# Patient Record
Sex: FEMALE | Race: WHITE | NOT HISPANIC OR LATINO | Employment: OTHER | URBAN - METROPOLITAN AREA
[De-identification: names, ages, dates, MRNs, and addresses within clinical notes are randomized per-mention and may not be internally consistent; named-entity substitution may affect disease eponyms.]

---

## 2017-01-30 ENCOUNTER — TELEPHONE (OUTPATIENT)
Dept: NURSING | Facility: CLINIC | Age: 47
End: 2017-01-30

## 2017-01-31 NOTE — TELEPHONE ENCOUNTER
Call Type: Triage Call    Presenting Problem: Last couple days used her inhaler for a heaviness  in her chest which seemed to help. Today upper abd pain and upper  back pain mostly in one shoulder area. mom had triple bypass at age  56. Refused to call 911. Will go to ED. Advised not to drive  herself.  Triage Note:  Guideline Title: Chest Pain  Recommended Disposition: Activate   Original Inclination: Would have called ED  Override Disposition:  Intended Action: Go to Hospital / ED  Physician Contacted: No  Chest pain spreading to the shoulders, neck, jaw, in one or both arms, stomach or  back lasting 5 or more minutes now or within the last hour. Pain is NOT  associated with taking a deep breath or a productive cough, movement, or touch to  a localized area. ?  YES  Loss of consciousness for any period of time ? NO  New or worsening signs and symptoms that may indicate shock ? NO  Pressure, fullness, squeezing sensation or pain anywhere in the chest lasting 5 or  more minutes now or within the last hour. Pain is NOT associated with taking a  deep breath or a productive cough, movement, or touch to a localized area on the  chest. ? NO  Physician Instructions:  Care Advice:

## 2017-05-16 ENCOUNTER — RECORDS - HEALTHEAST (OUTPATIENT)
Dept: LAB | Facility: CLINIC | Age: 47
End: 2017-05-16

## 2017-05-16 LAB
CHOLEST SERPL-MCNC: 182 MG/DL
FASTING STATUS PATIENT QL REPORTED: NO
HDLC SERPL-MCNC: 63 MG/DL
LDLC SERPL CALC-MCNC: 94 MG/DL
TRIGL SERPL-MCNC: 124 MG/DL

## 2018-04-17 ENCOUNTER — HOSPITAL ENCOUNTER (OUTPATIENT)
Dept: MAMMOGRAPHY | Facility: CLINIC | Age: 48
Discharge: HOME | End: 2018-04-17
Attending: FAMILY MEDICINE

## 2018-04-17 DIAGNOSIS — Z12.31 VISIT FOR SCREENING MAMMOGRAM: ICD-10-CM

## 2018-12-06 ENCOUNTER — OFFICE VISIT (OUTPATIENT)
Dept: FAMILY MEDICINE | Facility: CLINIC | Age: 48
End: 2018-12-06
Payer: COMMERCIAL

## 2018-12-06 VITALS
HEART RATE: 90 BPM | OXYGEN SATURATION: 95 % | TEMPERATURE: 99 F | BODY MASS INDEX: 27.11 KG/M2 | SYSTOLIC BLOOD PRESSURE: 130 MMHG | RESPIRATION RATE: 16 BRPM | DIASTOLIC BLOOD PRESSURE: 86 MMHG | WEIGHT: 183 LBS | HEIGHT: 69 IN

## 2018-12-06 DIAGNOSIS — J98.01 ACUTE BRONCHOSPASM: ICD-10-CM

## 2018-12-06 DIAGNOSIS — J06.9 UPPER RESPIRATORY TRACT INFECTION, UNSPECIFIED TYPE: ICD-10-CM

## 2018-12-06 DIAGNOSIS — R59.1 LA (LYMPHADENOPATHY): ICD-10-CM

## 2018-12-06 DIAGNOSIS — R07.0 THROAT PAIN: Primary | ICD-10-CM

## 2018-12-06 DIAGNOSIS — K50.00 CROHN'S DISEASE OF SMALL INTESTINE WITHOUT COMPLICATION (H): ICD-10-CM

## 2018-12-06 DIAGNOSIS — R68.89 ABNORMAL NECK FINDING: ICD-10-CM

## 2018-12-06 LAB
DEPRECATED S PYO AG THROAT QL EIA: NORMAL
SPECIMEN SOURCE: NORMAL

## 2018-12-06 PROCEDURE — 87081 CULTURE SCREEN ONLY: CPT | Performed by: NURSE PRACTITIONER

## 2018-12-06 PROCEDURE — 87880 STREP A ASSAY W/OPTIC: CPT | Performed by: NURSE PRACTITIONER

## 2018-12-06 PROCEDURE — 99204 OFFICE O/P NEW MOD 45 MIN: CPT | Performed by: NURSE PRACTITIONER

## 2018-12-06 RX ORDER — BALSALAZIDE DISODIUM 750 MG/1
2250 CAPSULE ORAL 3 TIMES DAILY
COMMUNITY

## 2018-12-06 RX ORDER — ALBUTEROL SULFATE 90 UG/1
2 AEROSOL, METERED RESPIRATORY (INHALATION) EVERY 6 HOURS PRN
Qty: 1 INHALER | Refills: 0 | Status: SHIPPED | OUTPATIENT
Start: 2018-12-06 | End: 2019-06-12

## 2018-12-06 RX ORDER — BENZONATATE 200 MG/1
200 CAPSULE ORAL 3 TIMES DAILY PRN
Qty: 21 CAPSULE | Refills: 0 | Status: SHIPPED | OUTPATIENT
Start: 2018-12-06 | End: 2019-05-29

## 2018-12-06 RX ORDER — CODEINE PHOSPHATE AND GUAIFENESIN 10; 100 MG/5ML; MG/5ML
1 SOLUTION ORAL
Qty: 120 ML | Refills: 0 | Status: SHIPPED | OUTPATIENT
Start: 2018-12-06 | End: 2019-05-29

## 2018-12-06 RX ORDER — IPRATROPIUM BROMIDE 42 UG/1
2 SPRAY, METERED NASAL 4 TIMES DAILY PRN
Qty: 1 BOX | Refills: 1 | Status: SHIPPED | OUTPATIENT
Start: 2018-12-06 | End: 2019-05-29

## 2018-12-06 NOTE — NURSING NOTE
"Initial /86  Pulse 90  Temp 99  F (37.2  C) (Tympanic)  Resp 16  Ht 5' 9\" (1.753 m)  Wt 183 lb (83 kg)  LMP 11/25/2018 (Approximate)  SpO2 95%  Breastfeeding? No  BMI 27.02 kg/m2 Estimated body mass index is 27.02 kg/(m^2) as calculated from the following:    Height as of this encounter: 5' 9\" (1.753 m).    Weight as of this encounter: 183 lb (83 kg). .    Judy Finnegan CMA ([facility])  "

## 2018-12-06 NOTE — MR AVS SNAPSHOT
After Visit Summary   12/6/2018    Nini Burden    MRN: [id number]           Patient Information     Date Of Birth          1970        Visit Information        Provider Department      12/6/2018 11:00 AM Christine Mcclelland NP [facility]        Today's Diagnoses     Throat pain    -  1    Upper respiratory tract infection, unspecified type        Acute bronchospasm        LA (lymphadenopathy)        Abnormal neck finding        Crohn's disease of small intestine without complication (H)          Care Instructions    1. Drink plenty of fluids.  2. May use tylenol or ibuprofen for any discomfort you are having.  3. Use Neti pot or nasal saline if you are having nasal or sinus congestion  4. Use over the counter decongestant if needed  5. Use over the counter cough suppressant at night if needed  6. Suggest humidifier in room at night  7. Return to clinic if no improvement in 1-2 weeks  8. Prescription written for Cough medication both for day and nighttime, as well as ALbuterol inhaler and Nasal spray.    Recommended follow up with ENT as suggested previously.    Follow-up with me or PCP in clinic to recheck lymph nodes and neck after illness has resolved.    LILIAM Seo              Follow-ups after your visit        Who to contact     If you have questions or need follow up information about today's clinic visit or your schedule please contact [facility] directly at [phone].  Normal or non-critical lab and imaging results will be communicated to you by MyChart, letter or phone within 4 business days after the clinic has received the results. If you do not hear from us within 7 days, please contact the clinic through MyChart or phone. If you have a critical or abnormal lab result, we will notify you by phone as soon as possible.  Submit refill requests through [employer] or call your pharmacy and they will forward the refill request to us. Please allow 3  "business days for your refill to be completed.          Additional Information About Your Visit        Care EveryWhere ID     This is your Care EveryWhere ID. This could be used by other organizations to access your [facility] medical records  [id number]        Your Vitals Were     Pulse Temperature Respirations Height Last Period Pulse Oximetry    90 99  F (37.2  C) (Tympanic) 16 5' 9\" (1.753 m) 11/25/2018 (Approximate) 95%    Breastfeeding? BMI (Body Mass Index)                No 27.02 kg/m2           Blood Pressure from Last 3 Encounters:   12/06/18 130/86   05/25/16 90/62   10/20/12 136/74    Weight from Last 3 Encounters:   12/06/18 183 lb (83 kg)   05/25/16 169 lb (76.7 kg)   11/16/12 165 lb (74.8 kg)              We Performed the Following     Beta strep group A culture     Rapid strep screen          Today's Medication Changes          These changes are accurate as of 12/6/18 11:46 AM.  If you have any questions, ask your nurse or doctor.               Start taking these medicines.        Dose/Directions    albuterol 108 (90 Base) MCG/ACT inhaler   Commonly known as:  PROAIR HFA/PROVENTIL HFA/VENTOLIN HFA   Used for:  Acute bronchospasm, Upper respiratory tract infection, unspecified type   Started by:  Christine Mcclelland NP        Dose:  2 puff   Inhale 2 puffs into the lungs every 6 hours as needed for shortness of breath / dyspnea or wheezing   Quantity:  1 Inhaler   Refills:  0       benzonatate 200 MG capsule   Commonly known as:  TESSALON   Used for:  Acute bronchospasm, Upper respiratory tract infection, unspecified type   Started by:  Christine Mcclelland NP        Dose:  200 mg   Take 1 capsule (200 mg) by mouth 3 times daily as needed for cough   Quantity:  21 capsule   Refills:  0       guaiFENesin-codeine 100-10 MG/5ML solution   Commonly known as:  ROBITUSSIN AC   Used for:  Upper respiratory tract infection, unspecified type, Acute bronchospasm, Throat pain   Started by:  Christine Mcclelland" MAURI Thompson        Dose:  1 tsp.   Take 5 mLs by mouth nightly as needed for cough   Quantity:  120 mL   Refills:  0       ipratropium 0.06 % nasal spray   Commonly known as:  ATROVENT   Used for:  Acute bronchospasm, Upper respiratory tract infection, unspecified type   Started by:  Christine Mcclelland NP        Dose:  2 spray   Spray 2 sprays into both nostrils 4 times daily as needed for rhinitis   Quantity:  1 Box   Refills:  1            Where to get your medicines      These medications were sent to [facility] [address]  [address] [address]     Phone:  [phone]     albuterol 108 (90 Base) MCG/ACT inhaler    benzonatate 200 MG capsule    ipratropium 0.06 % nasal spray         Some of these will need a paper prescription and others can be bought over the counter.  Ask your nurse if you have questions.     Bring a paper prescription for each of these medications     guaiFENesin-codeine 100-10 MG/5ML solution                Primary Care Provider Office Phone # Fax #    [facility] [phone]       [address] [address]        Equal Access to Services     ESVIN CARRERO AH: Hadii peter gonzales hadciroo Sosommer, waaxda luqadaha, qaybta kaalmada adeegyada, alena brooks. So [facility] [phone].    ATENCIÓN: Si habla español, tiene a delgadillo disposición servicios gratuitos de asistencia lingüística. Coretta al [phone].    We comply with applicable federal civil rights laws and [place] laws. We do not discriminate on the basis of race, color, national origin, age, disability, sex, sexual orientation, or gender identity.            Thank you!     Thank you for choosing [facility]  for your care. Our goal is always to provide you with excellent care. Hearing back from our patients is one way we can continue to improve our services. Please take a few minutes to complete the written  survey that you may receive in the mail after your visit with us. Thank you!             Your Updated Medication List - Protect others around you: Learn how to safely use, store and throw away your medicines at www.disposemymeds.org.          This list is accurate as of 12/6/18 11:46 AM.  Always use your most recent med list.                   Brand Name Dispense Instructions for use Diagnosis    albuterol 108 (90 Base) MCG/ACT inhaler    PROAIR HFA/PROVENTIL HFA/VENTOLIN HFA    1 Inhaler    Inhale 2 puffs into the lungs every 6 hours as needed for shortness of breath / dyspnea or wheezing    Acute bronchospasm, Upper respiratory tract infection, unspecified type       albuterol 90 MCG/ACT inhaler      1 TO 2 PUFFS BY INHALATION EVERY 4 TO 6 HOURS        balsalazide 750 MG capsule    COLAZAL     Take 2,250 mg by mouth 3 times daily        benzonatate 200 MG capsule    TESSALON    21 capsule    Take 1 capsule (200 mg) by mouth 3 times daily as needed for cough    Acute bronchospasm, Upper respiratory tract infection, unspecified type       guaiFENesin-codeine 100-10 MG/5ML solution    ROBITUSSIN AC    120 mL    Take 5 mLs by mouth nightly as needed for cough    Upper respiratory tract infection, unspecified type, Acute bronchospasm, Throat pain       ipratropium 0.06 % nasal spray    ATROVENT    1 Box    Spray 2 sprays into both nostrils 4 times daily as needed for rhinitis    Acute bronchospasm, Upper respiratory tract infection, unspecified type       ORTHO-NOVUM 7/7/7 (28) PO      1 TABLET BY MOUTH DAILY        oxyCODONE-acetaminophen 5-325 MG tablet    PERCOCET     Take 1 tablet by mouth 3 times daily

## 2018-12-06 NOTE — PATIENT INSTRUCTIONS
1. Drink plenty of fluids.  2. May use tylenol or ibuprofen for any discomfort you are having.  3. Use Neti pot or nasal saline if you are having nasal or sinus congestion  4. Use over the counter decongestant if needed  5. Use over the counter cough suppressant at night if needed  6. Suggest humidifier in room at night  7. Return to clinic if no improvement in 1-2 weeks  8. Prescription written for Cough medication both for day and nighttime, as well as ALbuterol inhaler and Nasal spray.    Recommended follow up with ENT as suggested previously.    Follow-up with me or PCP in clinic to recheck lymph nodes and neck after illness has resolved.    LILIAM Seo

## 2018-12-06 NOTE — PROGRESS NOTES
SUBJECTIVE:   Nini Burden is a 48 year old female who presents to clinic today for the following health issues:    ENT Symptoms             Symptoms: cc Present Absent Comment   Fever/Chills   x    Fatigue  x     Muscle Aches  x     Eye Irritation   x    Sneezing   x    Nasal Jordin/Drg  x     Sinus Pressure/Pain  x     Loss of smell   x    Dental pain   x    Sore Throat  x     Swollen Glands   x    Ear Pain/Fullness  x  Itchy ears    Cough  x  Intermittently productive    Wheeze  x     Chest Pain   x Chest feels heavy and tight.    Shortness of breath  x     Rash   x    Other   x      Symptom duration:  4 days    Symptom severity:  moderate    Treatments tried:  cough drops, chloraseptic spray, oxycodone for pains    Contacts: None      Started as sore throat and cough, now more upper respiratory and congestion with runny nose.    No fevers - some chills.  No change in appetite.    Feels more chest congestion - no shortness of breath   No chest pain   History of Crohn's - treated with Balsalazide - managed well.      Problem list and histories reviewed & adjusted, as indicated.  Additional history: as documented    Patient Active Problem List   Diagnosis     Crohns disease (H)     Fibromyalgia     ALMA (Obstructive Sleep Apnea)-Mild (AHI 12); REM RDI 68     Past Surgical History:   Procedure Laterality Date     SALPINGO OOPHORECTOMY,R/L/ARY  1984     Oophorectomy, LT     SURGICAL HISTORY OF -   2001    excized ganglion cyst       Social History   Substance Use Topics     Smoking status: Former Smoker     Packs/day: 0.50     Years: 20.00     Types: Cigarettes     Smokeless tobacco: Never Used     Alcohol use Yes      Comment: 6 beers a month     Family History   Problem Relation Age of Onset     Diabetes Mother      Respiratory Father      ALMA     Respiratory Brother      ALMA         Current Outpatient Prescriptions   Medication Sig Dispense Refill     ALBUTEROL 90 MCG/ACT IN AERS 1 TO 2 PUFFS BY INHALATION EVERY 4  "TO 6 HOURS       balsalazide (COLAZAL) 750 MG capsule Take 2,250 mg by mouth 3 times daily       ORTHO-NOVUM 7/7/7 (28) OR 1 TABLET BY MOUTH DAILY       oxyCODONE-acetaminophen (PERCOCET) 5-325 MG per tablet Take 1 tablet by mouth 3 times daily       Allergies   Allergen Reactions     Amoxicillin [Penicillins] Rash     Full body rash     Citalopram      Rapid heart beat, sensation of burning up legs     Erythromycin Nausea and Vomiting     Vomiting     Sulfa Drugs Difficulty breathing     Lung spasms     Recent Labs   Lab Test 05/03/16 03/10/16  10/20/12   1955  04/13/12 04/08/11   LDL  133   --    --    --   116  131   HDL  65   --    --    --   54  69   TRIG  98   --    --    --   142  88   ALT  19  23  30   < >   --   31   CR  0.72  0.68  0.70   < >   --   0.71   GFRESTIMATED  >60  >60  >90   < >   --   >60   GFRESTBLACK  >60  >60  >90   < >   --   >60   POTASSIUM  4.0  4.0  3.4   < >   --   4.0   TSH  1.96  1.17   --    --    --    --     < > = values in this interval not displayed.      BP Readings from Last 3 Encounters:   12/06/18 130/86   05/25/16 90/62   10/20/12 136/74    Wt Readings from Last 3 Encounters:   12/06/18 183 lb (83 kg)   05/25/16 169 lb (76.7 kg)   11/16/12 165 lb (74.8 kg)                  Labs reviewed in EPIC    Reviewed and updated as needed this visit by clinical staff       Reviewed and updated as needed this visit by Provider         ROS:  Constitutional, HEENT, cardiovascular, pulmonary, GI, , musculoskeletal, neuro, skin, endocrine and psych systems are negative, except as otherwise noted.    OBJECTIVE:     /86  Pulse 90  Temp 99  F (37.2  C) (Tympanic)  Resp 16  Ht 5' 9\" (1.753 m)  Wt 183 lb (83 kg)  LMP 11/25/2018 (Approximate)  SpO2 95%  Breastfeeding? No  BMI 27.02 kg/m2  Body mass index is 27.02 kg/(m^2).  GENERAL: alert and no distress  EYES: PERRL, EOMI and clear drainage bilateral, normal conjunctiva  HENT: normal cephalic/atraumatic, both ears: clear " effusion, nasal mucosa edematous , rhinorrhea clear, oral mucous membranes moist and tonsillar erythema  NECK: lymphadenopathy right anterior cervical chain, swelling right neck adjacent to thyroid gland.  Thyroid gland 1 1/2 X normal size.  RESP: lungs clear to auscultation - no rales, rhonchi or wheezes  BREAST: normal without masses, tenderness or nipple discharge and no palpable axillary masses or adenopathy  CV: regular rate and rhythm, normal S1 S2, no S3 or S4, no murmur, click or rub, no peripheral edema and peripheral pulses strong  ABDOMEN: soft, nontender, no hepatosplenomegaly, no masses and bowel sounds normal  MS: no gross musculoskeletal defects noted, no edema  SKIN: no suspicious lesions or rashes  NEURO: Normal strength and tone, mentation intact and speech normal  PSYCH: mentation appears normal, affect normal/bright    Diagnostic Test Results:  Results for orders placed or performed in visit on 12/06/18 (from the past 24 hour(s))   Rapid strep screen   Result Value Ref Range    Specimen Description Throat     Rapid Strep A Screen       NEGATIVE: No Group A streptococcal antigen detected by immunoassay, await culture report.       ASSESSMENT/PLAN:     1. Upper respiratory tract infection, unspecified type   The risks, benefits and treatment options of prescribed medications or other treatments have been discussed with the patient. The patient verbalized their understanding and should call or follow up if no improvement or if they develop further problems.    - albuterol (PROAIR HFA/PROVENTIL HFA/VENTOLIN HFA) 108 (90 Base) MCG/ACT inhaler; Inhale 2 puffs into the lungs every 6 hours as needed for shortness of breath / dyspnea or wheezing  Dispense: 1 Inhaler; Refill: 0  - ipratropium (ATROVENT) 0.06 % nasal spray; Spray 2 sprays into both nostrils 4 times daily as needed for rhinitis  Dispense: 1 Box; Refill: 1  - guaiFENesin-codeine (ROBITUSSIN AC) 100-10 MG/5ML solution; Take 5 mLs by mouth  nightly as needed for cough  Dispense: 120 mL; Refill: 0  - benzonatate (TESSALON) 200 MG capsule; Take 1 capsule (200 mg) by mouth 3 times daily as needed for cough  Dispense: 21 capsule; Refill: 0    2. Acute bronchospasm     - albuterol (PROAIR HFA/PROVENTIL HFA/VENTOLIN HFA) 108 (90 Base) MCG/ACT inhaler; Inhale 2 puffs into the lungs every 6 hours as needed for shortness of breath / dyspnea or wheezing  Dispense: 1 Inhaler; Refill: 0  - ipratropium (ATROVENT) 0.06 % nasal spray; Spray 2 sprays into both nostrils 4 times daily as needed for rhinitis  Dispense: 1 Box; Refill: 1  - guaiFENesin-codeine (ROBITUSSIN AC) 100-10 MG/5ML solution; Take 5 mLs by mouth nightly as needed for cough  Dispense: 120 mL; Refill: 0  - benzonatate (TESSALON) 200 MG capsule; Take 1 capsule (200 mg) by mouth 3 times daily as needed for cough  Dispense: 21 capsule; Refill: 0    3. Throat pain     - Rapid strep screen  - Beta strep group A culture  - guaiFENesin-codeine (ROBITUSSIN AC) 100-10 MG/5ML solution; Take 5 mLs by mouth nightly as needed for cough  Dispense: 120 mL; Refill: 0    4. LA (lymphadenopathy)  Most likely related to above findings - but recommended recheck given abnormal neck findings and thyroid findings.  Follow-up with ENT as discussed.    5. Abnormal neck finding   Follow-up with PCP and ENT to recheck in 2-3 weeks.  Most likely would need US neck/thyroid.    6. Crohn's disease of small intestine without complication (H)   Stable on oral medication.    Total times spent with patient 30 minutes of which > 50% of the time was spent counseling and coordination of care discussion of above complaints, review of chart, medications, problem list, recommendations discussed, referral, treatment plan and follow up     Patient Instructions   1. Drink plenty of fluids.  2. May use tylenol or ibuprofen for any discomfort you are having.  3. Use Neti pot or nasal saline if you are having nasal or sinus congestion  4. Use over  the counter decongestant if needed  5. Use over the counter cough suppressant at night if needed  6. Suggest humidifier in room at night  7. Return to clinic if no improvement in 1-2 weeks  8. Prescription written for Cough medication both for day and nighttime, as well as ALbuterol inhaler and Nasal spray.    Recommended follow up with ENT as suggested previously.    Follow-up with me or PCP in clinic to recheck lymph nodes and neck after illness has resolved.    LILIAM Seo NP  [facility]

## 2018-12-06 NOTE — LETTER
December 7, 2018      Nini Burden  [address]  [place] [address]        The results of your recent throat culture were negative.  If you have any further questions or concerns please contact the clinic.            Sincerely,        Christine Mcclelland NP

## 2018-12-06 NOTE — LETTER
[facility]  [address] [address]  Phone: [phone]    December 6, 2018        Nini Burden  [address] [place] [address]          To whom it may concern:    RE: Nini Burden    Patient was seen and treated today at our clinic and missed work 12/5/2018 and 12/6/2018.      Please contact me for questions or concerns.      Sincerely,        Christine Mcclelland NP

## 2018-12-07 LAB
BACTERIA SPEC CULT: NORMAL
SPECIMEN SOURCE: NORMAL

## 2019-03-22 ENCOUNTER — RECORDS - HEALTHEAST (OUTPATIENT)
Dept: LAB | Facility: CLINIC | Age: 49
End: 2019-03-22

## 2019-03-22 ENCOUNTER — TRANSFERRED RECORDS (OUTPATIENT)
Dept: HEALTH INFORMATION MANAGEMENT | Facility: CLINIC | Age: 49
End: 2019-03-22

## 2019-03-22 LAB
ALBUMIN SERPL-MCNC: 4 G/DL (ref 3.5–5)
ALP SERPL-CCNC: 82 U/L (ref 45–120)
ALT SERPL W P-5'-P-CCNC: 19 U/L (ref 0–45)
ANION GAP SERPL CALCULATED.3IONS-SCNC: 9 MMOL/L (ref 5–18)
AST SERPL W P-5'-P-CCNC: 22 U/L (ref 0–40)
BILIRUB SERPL-MCNC: 0.4 MG/DL (ref 0–1)
BUN SERPL-MCNC: 13 MG/DL (ref 8–22)
CALCIUM SERPL-MCNC: 9.8 MG/DL (ref 8.5–10.5)
CHLORIDE BLD-SCNC: 103 MMOL/L (ref 98–107)
CO2 SERPL-SCNC: 26 MMOL/L (ref 22–31)
CREAT SERPL-MCNC: 0.84 MG/DL (ref 0.6–1.1)
GFR SERPL CREATININE-BSD FRML MDRD: >60 ML/MIN/1.73M2
GLUCOSE BLD-MCNC: 85 MG/DL (ref 70–125)
PAP SMEAR - HIM PATIENT REPORTED: NEGATIVE
POTASSIUM BLD-SCNC: 4.4 MMOL/L (ref 3.5–5)
PROT SERPL-MCNC: 7.1 G/DL (ref 6–8)
SODIUM SERPL-SCNC: 138 MMOL/L (ref 136–145)

## 2019-05-29 ENCOUNTER — OFFICE VISIT (OUTPATIENT)
Dept: FAMILY MEDICINE | Facility: CLINIC | Age: 49
End: 2019-05-29
Payer: COMMERCIAL

## 2019-05-29 ENCOUNTER — TELEPHONE (OUTPATIENT)
Dept: FAMILY MEDICINE | Facility: CLINIC | Age: 49
End: 2019-05-29

## 2019-05-29 VITALS
HEIGHT: 68 IN | DIASTOLIC BLOOD PRESSURE: 82 MMHG | HEART RATE: 91 BPM | WEIGHT: 186.7 LBS | RESPIRATION RATE: 18 BRPM | BODY MASS INDEX: 28.3 KG/M2 | TEMPERATURE: 99 F | OXYGEN SATURATION: 97 % | SYSTOLIC BLOOD PRESSURE: 118 MMHG

## 2019-05-29 DIAGNOSIS — R10.13 EPIGASTRIC PAIN: Primary | ICD-10-CM

## 2019-05-29 DIAGNOSIS — A09 DIARRHEA OF INFECTIOUS ORIGIN: ICD-10-CM

## 2019-05-29 PROCEDURE — 99213 OFFICE O/P EST LOW 20 MIN: CPT | Performed by: NURSE PRACTITIONER

## 2019-05-29 RX ORDER — ONDANSETRON 4 MG/1
4 TABLET, ORALLY DISINTEGRATING ORAL EVERY 8 HOURS PRN
Qty: 15 TABLET | Refills: 2 | Status: SHIPPED | OUTPATIENT
Start: 2019-05-29 | End: 2019-06-12

## 2019-05-29 ASSESSMENT — MIFFLIN-ST. JEOR: SCORE: 1511.4

## 2019-05-29 NOTE — PROGRESS NOTES
"Subjective     Nini Burden is a 49 year old female who presents to clinic today for the following health issues:    Chief Complaint   Patient presents with     Diarrhea     Came back last Monday from a road trip, went to [facility], ever since then she has been nausous and has been having diarrhea off and on      Nausea     Feeling nauseous and fatigued          HPI   Diarrhea  Onset: 9 days intermittent     Description:   Consistency of stool: watery  Blood in stool: no   Number of loose stools in past 24 hours: 0     Progression of Symptoms:  intermittent    Accompanying Signs & Symptoms:  Fever: no   Nausea or vomiting; YES- nauesous   Abdominal pain: epigastric pain   Episodes of constipation: no   Weight loss: YES- 5 pounds     History:   Ill contacts: no   Recent use of antibiotics: no    Recent travels: YES- went to [facility]          Recent medication-new or changes(Rx or OTC): no     Precipitating factors:   None     Alleviating factors:   None     Therapies Tried and outcome: Has crohns and upped her crohns medication- this seemed to help       BP Readings from Last 3 Encounters:   05/29/19 118/82   12/06/18 130/86   05/25/16 90/62    Wt Readings from Last 3 Encounters:   05/29/19 84.7 kg (186 lb 11.2 oz)   12/06/18 83 kg (183 lb)   05/25/16 76.7 kg (169 lb)            Reviewed and updated as needed this visit by Provider         Review of Systems   ROS COMP: Constitutional, HEENT, cardiovascular, pulmonary, gi and gu systems are negative, except as otherwise noted.      Objective    /82 (BP Location: Left arm, Patient Position: Sitting, Cuff Size: Adult Regular)   Pulse 91   Temp 99  F (37.2  C) (Tympanic)   Resp 18   Ht 1.721 m (5' 7.75\")   Wt 84.7 kg (186 lb 11.2 oz)   LMP 05/14/2019   SpO2 97%   BMI 28.60 kg/m    Body mass index is 28.6 kg/m .  Physical Exam   GENERAL: healthy, alert and no distress  ABDOMEN: soft, nontender, without hepatosplenomegaly or masses, tenderness epigastric and " no organomegaly or masses  PSYCH: mentation appears normal, affect normal/bright    Diagnostic Test Results:  Labs reviewed in Epic        Assessment & Plan     1. Epigastric pain  -start Zantac 150 mg twice daily   -if still no improvement will add PPI: Omeprazole 20 mg daily AM   - ondansetron (ZOFRAN-ODT) 4 MG ODT tab; Take 1 tablet (4 mg) by mouth every 8 hours as needed for nausea or vomiting  Dispense: 15 tablet; Refill: 2    2. Diarrhea of infectious origin  -symptoms improving, likely due to viral gastroenteritis   -discussed BRAT diet   - Enteric Bacteria and Virus Panel by [facility] Stool; Future     See Patient Instructions    Return in about 3 days (around 6/1/2019) for Lab Work.    JOSEPH De Los Santos [facility]

## 2019-05-29 NOTE — TELEPHONE ENCOUNTER
Patient had pap done March 22 2019 at [facility] in [facility]. Pap was normal, needs to repeat pap every 3 years, please update her chart    Thanks,    Philomena Huggins MA

## 2019-05-29 NOTE — PATIENT INSTRUCTIONS
Zantac 150 mg twice daily before meals, if no improvement can try Omeprazole, or Protonix, please call clinic for prescription   Zofran 1 tablet every 8 hrs as needed for nausea and vomiting  Labs: to check stool for infections if still having diarrhea

## 2019-06-12 ENCOUNTER — OFFICE VISIT (OUTPATIENT)
Dept: FAMILY MEDICINE | Facility: CLINIC | Age: 49
End: 2019-06-12
Payer: COMMERCIAL

## 2019-06-12 VITALS
OXYGEN SATURATION: 96 % | WEIGHT: 186.6 LBS | HEART RATE: 81 BPM | BODY MASS INDEX: 29.29 KG/M2 | DIASTOLIC BLOOD PRESSURE: 82 MMHG | TEMPERATURE: 98.6 F | SYSTOLIC BLOOD PRESSURE: 118 MMHG | HEIGHT: 67 IN | RESPIRATION RATE: 16 BRPM

## 2019-06-12 DIAGNOSIS — J02.9 THROAT SORENESS: Primary | ICD-10-CM

## 2019-06-12 LAB
DEPRECATED S PYO AG THROAT QL EIA: NORMAL
SPECIMEN SOURCE: NORMAL

## 2019-06-12 PROCEDURE — 99213 OFFICE O/P EST LOW 20 MIN: CPT | Performed by: FAMILY MEDICINE

## 2019-06-12 PROCEDURE — 87081 CULTURE SCREEN ONLY: CPT | Performed by: FAMILY MEDICINE

## 2019-06-12 PROCEDURE — 87880 STREP A ASSAY W/OPTIC: CPT | Performed by: FAMILY MEDICINE

## 2019-06-12 RX ORDER — MULTIPLE VITAMINS W/ MINERALS TAB 9MG-400MCG
1 TAB ORAL DAILY
COMMUNITY

## 2019-06-12 ASSESSMENT — MIFFLIN-ST. JEOR: SCORE: 1503

## 2019-06-12 NOTE — LETTER
June 13, 2019      Nini Burden  [address]  [place] [address]        The results of your recent throat culture were negative.  If you have any further questions or concerns please contact the clinic.            Sincerely,        Lavelle Doe MD/jefferson

## 2019-06-12 NOTE — PATIENT INSTRUCTIONS
Thank you for choosing [facility].  You may be receiving an email and/or telephone survey request from [facility] [employer] regarding your visit today.  Please take a few minutes to respond to the survey to let us know how we are doing.      If you have questions or concerns, please contact us via [employer] or you can contact your care team at [phone].    Our Clinic hours are:  Monday 6:40 am  to 7:00 pm  Tuesday -Friday 6:40 am to 5:00 pm    The [place] outpatient lab hours are:  Monday - Friday 6:10 am to 4:45 pm  Saturdays 7:00 am to 11:00 am  Appointments are required, call [phone]    If you have clinical questions after hours or would like to schedule an appointment,  call the clinic at [phone].    (J02.9) Throat soreness  (primary encounter diagnosis)  Comment:   Plan: Rapid strep screen, Beta strep group A culture        The rapid strep test is negative and the 24 hour is pending. If this is positive then Zithromax is recommended.   Avoid contagious exposures and use the symptomatic therapies. Follow up as needed.

## 2019-06-12 NOTE — PROGRESS NOTES
"Subjective     Nini Burden is a 49 year old female who presents to clinic today for the following health issues:  Chief Complaint   Patient presents with     Pharyngitis         ENT Symptoms             Symptoms: cc Present Absent Comment   Fever/Chills  X  Chills   Fatigue  X     Muscle Aches  X     Eye Irritation  X  Dry    Sneezing   X    Nasal Jordin/Drg  X     Sinus Pressure/Pain   X    Loss of smell   X    Dental pain   X    Sore Throat X X     Swollen Glands  X     Ear Pain/Fullness   X Itchy Ears   Cough   X    Wheeze   X    Chest Pain   X    Shortness of breath   X    Rash   X    Other  X  Swollen Uvula      Symptom duration:  x 2 Days    Symptom severity:  moderate   Treatments tried:  Chloroseptic spray    Contacts:  none          Current Outpatient Medications:      balsalazide (COLAZAL) 750 MG capsule, Take 2,250 mg by mouth 3 times daily, Disp: , Rfl:      Cholecalciferol (VITAMIN D PO), 3 tablets daily , 3,000 units daily, Disp: , Rfl:      multivitamin w/minerals (MULTI-VITAMIN) tablet, Take 1 tablet by mouth daily, Disp: , Rfl:      ORTHO-NOVUM 7/7/7 (28) OR, 1 TABLET BY MOUTH DAILY, Disp: , Rfl:      oxyCODONE-acetaminophen (PERCOCET) 5-325 MG per tablet, Take 1 tablet by mouth 3 times daily, Disp: , Rfl:      Taurine 1000 MG CAPS, Take 1 capsule by mouth daily, Disp: , Rfl:      vitamin B complex with vitamin C (VITAMIN  B COMPLEX) tablet, Take 1 tablet by mouth daily, Disp: , Rfl:     Patient Active Problem List   Diagnosis     Crohns disease (H)     Fibromyalgia     ALMA (Obstructive Sleep Apnea)-Mild (AHI 12); REM RDI 68       Blood pressure 118/82, pulse 81, temperature 98.6  F (37  C), temperature source Tympanic, resp. rate 16, height 1.708 m (5' 7.25\"), weight 84.6 kg (186 lb 9.6 oz), last menstrual period 06/09/2019, SpO2 96 %, not currently breastfeeding.    Exam:  GENERAL APPEARANCE: healthy, alert and no distress  EYES: EOMI,  PERRL  HENT: ear canals and TM's normal and nose and mouth " without ulcers or lesions  HENT: tonsillar hypertrophy  NECK: bilateral anterior cervical adenopathy  NEURO: Normal strength and tone, sensory exam grossly normal, mentation intact and speech normal      (J02.9) Throat soreness  (primary encounter diagnosis)  Comment:   Plan: Rapid strep screen, Beta strep group A culture        The rapid strep test is negative and the 24 hour is pending. If this is positive then Zithromax is recommended.   Avoid contagious exposures and use the symptomatic therapies. Follow up as needed.       Lavelle Doe

## 2019-06-13 LAB
BACTERIA SPEC CULT: NORMAL
SPECIMEN SOURCE: NORMAL

## 2019-11-08 ENCOUNTER — HOSPITAL ENCOUNTER (OUTPATIENT)
Dept: MAMMOGRAPHY | Facility: CLINIC | Age: 49
Discharge: HOME | End: 2019-11-08
Attending: FAMILY MEDICINE

## 2019-11-08 DIAGNOSIS — Z12.31 VISIT FOR SCREENING MAMMOGRAM: ICD-10-CM

## 2020-08-28 ENCOUNTER — RECORDS - HEALTHEAST (OUTPATIENT)
Dept: LAB | Facility: CLINIC | Age: 50
End: 2020-08-28

## 2020-08-28 LAB
ALBUMIN SERPL-MCNC: 3.9 G/DL (ref 3.5–5)
ALP SERPL-CCNC: 79 U/L (ref 45–120)
ALT SERPL W P-5'-P-CCNC: 46 U/L (ref 0–45)
ANION GAP SERPL CALCULATED.3IONS-SCNC: 8 MMOL/L (ref 5–18)
AST SERPL W P-5'-P-CCNC: 46 U/L (ref 0–40)
BILIRUB SERPL-MCNC: 0.8 MG/DL (ref 0–1)
BUN SERPL-MCNC: 12 MG/DL (ref 8–22)
CALCIUM SERPL-MCNC: 9.3 MG/DL (ref 8.5–10.5)
CHLORIDE BLD-SCNC: 104 MMOL/L (ref 98–107)
CO2 SERPL-SCNC: 28 MMOL/L (ref 22–31)
CREAT SERPL-MCNC: 0.71 MG/DL (ref 0.6–1.1)
GFR SERPL CREATININE-BSD FRML MDRD: >60 ML/MIN/1.73M2
GLUCOSE BLD-MCNC: 84 MG/DL (ref 70–125)
POTASSIUM BLD-SCNC: 4.3 MMOL/L (ref 3.5–5)
PROT SERPL-MCNC: 7.1 G/DL (ref 6–8)
SODIUM SERPL-SCNC: 140 MMOL/L (ref 136–145)

## 2020-09-01 LAB

## 2021-04-19 ENCOUNTER — HOSPITAL ENCOUNTER (OUTPATIENT)
Dept: MAMMOGRAPHY | Facility: CLINIC | Age: 51
Discharge: HOME | End: 2021-04-19
Attending: FAMILY MEDICINE

## 2021-04-19 DIAGNOSIS — Z12.31 VISIT FOR SCREENING MAMMOGRAM: ICD-10-CM

## 2021-07-13 ENCOUNTER — RECORDS - HEALTHEAST (OUTPATIENT)
Dept: ADMINISTRATIVE | Facility: CLINIC | Age: 51
End: 2021-07-13

## 2021-07-21 ENCOUNTER — RECORDS - HEALTHEAST (OUTPATIENT)
Dept: ADMINISTRATIVE | Facility: CLINIC | Age: 51
End: 2021-07-21

## 2021-08-30 ENCOUNTER — LAB REQUISITION (OUTPATIENT)
Dept: LAB | Facility: CLINIC | Age: 51
End: 2021-08-30

## 2021-08-30 DIAGNOSIS — K50.119 CROHN'S DISEASE OF LARGE INTESTINE WITH UNSPECIFIED COMPLICATIONS (H): ICD-10-CM

## 2021-08-30 LAB
ALBUMIN SERPL-MCNC: 3.7 G/DL (ref 3.5–5)
ALP SERPL-CCNC: 68 U/L (ref 45–120)
ALT SERPL W P-5'-P-CCNC: 13 U/L (ref 0–45)
ANION GAP SERPL CALCULATED.3IONS-SCNC: 12 MMOL/L (ref 5–18)
AST SERPL W P-5'-P-CCNC: 15 U/L (ref 0–40)
BILIRUB SERPL-MCNC: 0.5 MG/DL (ref 0–1)
BUN SERPL-MCNC: 13 MG/DL (ref 8–22)
CALCIUM SERPL-MCNC: 9.3 MG/DL (ref 8.5–10.5)
CHLORIDE BLD-SCNC: 103 MMOL/L (ref 98–107)
CO2 SERPL-SCNC: 23 MMOL/L (ref 22–31)
CREAT SERPL-MCNC: 0.79 MG/DL (ref 0.6–1.1)
GFR SERPL CREATININE-BSD FRML MDRD: 86 ML/MIN/1.73M2
GLUCOSE BLD-MCNC: 92 MG/DL (ref 70–125)
POTASSIUM BLD-SCNC: 3.8 MMOL/L (ref 3.5–5)
PROT SERPL-MCNC: 6.7 G/DL (ref 6–8)
SODIUM SERPL-SCNC: 138 MMOL/L (ref 136–145)

## 2021-08-30 PROCEDURE — 82040 ASSAY OF SERUM ALBUMIN: CPT | Performed by: FAMILY MEDICINE

## 2021-08-30 PROCEDURE — 36415 COLL VENOUS BLD VENIPUNCTURE: CPT | Performed by: FAMILY MEDICINE

## 2021-11-29 ENCOUNTER — LAB REQUISITION (OUTPATIENT)
Dept: LAB | Facility: CLINIC | Age: 51
End: 2021-11-29

## 2021-11-29 DIAGNOSIS — N91.2 AMENORRHEA, UNSPECIFIED: ICD-10-CM

## 2021-11-29 LAB — FSH SERPL-ACNC: 9.8 MIU/ML

## 2021-11-29 PROCEDURE — 83001 ASSAY OF GONADOTROPIN (FSH): CPT | Performed by: FAMILY MEDICINE

## 2022-01-10 ENCOUNTER — LAB REQUISITION (OUTPATIENT)
Dept: LAB | Facility: CLINIC | Age: 52
End: 2022-01-10

## 2022-01-10 DIAGNOSIS — R35.0 FREQUENCY OF MICTURITION: ICD-10-CM

## 2022-01-10 PROCEDURE — 87086 URINE CULTURE/COLONY COUNT: CPT | Performed by: FAMILY MEDICINE

## 2022-01-12 LAB — BACTERIA UR CULT: ABNORMAL

## 2022-06-14 ENCOUNTER — LAB REQUISITION (OUTPATIENT)
Dept: LAB | Facility: CLINIC | Age: 52
End: 2022-06-14

## 2022-06-14 DIAGNOSIS — R21 RASH AND OTHER NONSPECIFIC SKIN ERUPTION: ICD-10-CM

## 2022-06-14 PROCEDURE — 87798 DETECT AGENT NOS DNA AMP: CPT | Performed by: PHYSICIAN ASSISTANT

## 2022-06-15 LAB — VZV DNA SPEC QL NAA+PROBE: POSITIVE

## 2022-09-12 ENCOUNTER — LAB REQUISITION (OUTPATIENT)
Dept: LAB | Facility: CLINIC | Age: 52
End: 2022-09-12

## 2022-09-12 DIAGNOSIS — K50.119 CROHN'S DISEASE OF LARGE INTESTINE WITH UNSPECIFIED COMPLICATIONS (H): ICD-10-CM

## 2022-09-12 DIAGNOSIS — G62.9 POLYNEUROPATHY, UNSPECIFIED: ICD-10-CM

## 2022-09-12 LAB
ERYTHROCYTE [DISTWIDTH] IN BLOOD BY AUTOMATED COUNT: 12.6 % (ref 10–15)
HCT VFR BLD AUTO: 44.6 % (ref 35–47)
HGB BLD-MCNC: 14.5 G/DL (ref 11.7–15.7)
MCH RBC QN AUTO: 31.3 PG (ref 26.5–33)
MCHC RBC AUTO-ENTMCNC: 32.5 G/DL (ref 31.5–36.5)
MCV RBC AUTO: 96 FL (ref 78–100)
PLATELET # BLD AUTO: 301 10E3/UL (ref 150–450)
RBC # BLD AUTO: 4.64 10E6/UL (ref 3.8–5.2)
WBC # BLD AUTO: 6.2 10E3/UL (ref 4–11)

## 2022-09-12 PROCEDURE — 82746 ASSAY OF FOLIC ACID SERUM: CPT | Performed by: FAMILY MEDICINE

## 2022-09-12 PROCEDURE — 82374 ASSAY BLOOD CARBON DIOXIDE: CPT | Performed by: FAMILY MEDICINE

## 2022-09-12 PROCEDURE — 82607 VITAMIN B-12: CPT | Performed by: FAMILY MEDICINE

## 2022-09-12 PROCEDURE — 85027 COMPLETE CBC AUTOMATED: CPT | Performed by: FAMILY MEDICINE

## 2022-09-12 PROCEDURE — 80061 LIPID PANEL: CPT | Performed by: FAMILY MEDICINE

## 2022-09-13 LAB
ALBUMIN SERPL BCG-MCNC: 4.7 G/DL (ref 3.5–5.2)
ALP SERPL-CCNC: 83 U/L (ref 35–104)
ALT SERPL W P-5'-P-CCNC: 32 U/L (ref 10–35)
ANION GAP SERPL CALCULATED.3IONS-SCNC: 13 MMOL/L (ref 7–15)
AST SERPL W P-5'-P-CCNC: 33 U/L (ref 10–35)
BILIRUB SERPL-MCNC: 0.8 MG/DL
BUN SERPL-MCNC: 13.1 MG/DL (ref 6–20)
CALCIUM SERPL-MCNC: 9.5 MG/DL (ref 8.6–10)
CHLORIDE SERPL-SCNC: 98 MMOL/L (ref 98–107)
CHOLEST SERPL-MCNC: 209 MG/DL
CREAT SERPL-MCNC: 0.73 MG/DL (ref 0.51–0.95)
DEPRECATED HCO3 PLAS-SCNC: 26 MMOL/L (ref 22–29)
FOLATE SERPL-MCNC: 18.8 NG/ML (ref 4.6–34.8)
GFR SERPL CREATININE-BSD FRML MDRD: >90 ML/MIN/1.73M2
GLUCOSE SERPL-MCNC: 75 MG/DL (ref 70–99)
HDLC SERPL-MCNC: 74 MG/DL
LDLC SERPL CALC-MCNC: 99 MG/DL
NONHDLC SERPL-MCNC: 135 MG/DL
POTASSIUM SERPL-SCNC: 4.2 MMOL/L (ref 3.4–5.3)
PROT SERPL-MCNC: 7.1 G/DL (ref 6.4–8.3)
SODIUM SERPL-SCNC: 137 MMOL/L (ref 136–145)
TRIGL SERPL-MCNC: 179 MG/DL
VIT B12 SERPL-MCNC: 461 PG/ML (ref 232–1245)

## 2023-03-20 ENCOUNTER — ANCILLARY PROCEDURE (OUTPATIENT)
Dept: MAMMOGRAPHY | Facility: CLINIC | Age: 53
End: 2023-03-20
Attending: FAMILY MEDICINE
Payer: COMMERCIAL

## 2023-03-20 DIAGNOSIS — Z12.31 VISIT FOR SCREENING MAMMOGRAM: ICD-10-CM

## 2023-03-20 PROCEDURE — 77067 SCR MAMMO BI INCL CAD: CPT

## 2023-05-19 ENCOUNTER — LAB REQUISITION (OUTPATIENT)
Dept: LAB | Facility: CLINIC | Age: 53
End: 2023-05-19
Payer: COMMERCIAL

## 2023-05-19 DIAGNOSIS — R05.1 ACUTE COUGH: ICD-10-CM

## 2023-05-19 PROCEDURE — 87635 SARS-COV-2 COVID-19 AMP PRB: CPT | Mod: ORL | Performed by: FAMILY MEDICINE

## 2023-05-19 PROCEDURE — 87635 SARS-COV-2 COVID-19 AMP PRB: CPT | Performed by: FAMILY MEDICINE

## 2023-05-20 LAB — SARS-COV-2 RNA RESP QL NAA+PROBE: NEGATIVE

## 2023-09-25 ENCOUNTER — LAB REQUISITION (OUTPATIENT)
Dept: LAB | Facility: CLINIC | Age: 53
End: 2023-09-25

## 2023-09-25 DIAGNOSIS — E78.2 MIXED HYPERLIPIDEMIA: ICD-10-CM

## 2023-09-25 DIAGNOSIS — K50.119 CROHN'S DISEASE OF LARGE INTESTINE WITH UNSPECIFIED COMPLICATIONS (H): ICD-10-CM

## 2023-09-25 DIAGNOSIS — R23.2 FLUSHING: ICD-10-CM

## 2023-09-25 LAB
ALBUMIN SERPL BCG-MCNC: 4.7 G/DL (ref 3.5–5.2)
ALP SERPL-CCNC: 85 U/L (ref 35–104)
ALT SERPL W P-5'-P-CCNC: 23 U/L (ref 0–50)
ANION GAP SERPL CALCULATED.3IONS-SCNC: 11 MMOL/L (ref 7–15)
AST SERPL W P-5'-P-CCNC: 24 U/L (ref 0–45)
BILIRUB SERPL-MCNC: 0.8 MG/DL
BUN SERPL-MCNC: 14.2 MG/DL (ref 6–20)
CALCIUM SERPL-MCNC: 9.3 MG/DL (ref 8.6–10)
CHLORIDE SERPL-SCNC: 102 MMOL/L (ref 98–107)
CHOLEST SERPL-MCNC: 185 MG/DL
CREAT SERPL-MCNC: 0.76 MG/DL (ref 0.51–0.95)
DEPRECATED HCO3 PLAS-SCNC: 25 MMOL/L (ref 22–29)
EGFRCR SERPLBLD CKD-EPI 2021: >90 ML/MIN/1.73M2
GLUCOSE SERPL-MCNC: 93 MG/DL (ref 70–99)
HDLC SERPL-MCNC: 75 MG/DL
LDLC SERPL CALC-MCNC: 90 MG/DL
NONHDLC SERPL-MCNC: 110 MG/DL
POTASSIUM SERPL-SCNC: 4.2 MMOL/L (ref 3.4–5.3)
PROT SERPL-MCNC: 7.2 G/DL (ref 6.4–8.3)
SODIUM SERPL-SCNC: 138 MMOL/L (ref 136–145)
TRIGL SERPL-MCNC: 101 MG/DL
TSH SERPL DL<=0.005 MIU/L-ACNC: 2.33 UIU/ML (ref 0.3–4.2)

## 2023-09-25 PROCEDURE — 80053 COMPREHEN METABOLIC PANEL: CPT | Performed by: FAMILY MEDICINE

## 2023-09-25 PROCEDURE — 84443 ASSAY THYROID STIM HORMONE: CPT | Performed by: FAMILY MEDICINE

## 2023-09-25 PROCEDURE — 80061 LIPID PANEL: CPT | Performed by: FAMILY MEDICINE

## 2024-02-23 ENCOUNTER — LAB REQUISITION (OUTPATIENT)
Dept: LAB | Facility: CLINIC | Age: 54
End: 2024-02-23

## 2024-02-23 DIAGNOSIS — K50.119 CROHN'S DISEASE OF LARGE INTESTINE WITH UNSPECIFIED COMPLICATIONS (H): ICD-10-CM

## 2024-02-23 LAB
ALBUMIN SERPL BCG-MCNC: 4.4 G/DL (ref 3.5–5.2)
ALP SERPL-CCNC: 87 U/L (ref 40–150)
ALT SERPL W P-5'-P-CCNC: 49 U/L (ref 0–50)
ANION GAP SERPL CALCULATED.3IONS-SCNC: 12 MMOL/L (ref 7–15)
AST SERPL W P-5'-P-CCNC: 39 U/L (ref 0–45)
BILIRUB SERPL-MCNC: 0.6 MG/DL
BUN SERPL-MCNC: 18.8 MG/DL (ref 6–20)
CALCIUM SERPL-MCNC: 9.1 MG/DL (ref 8.6–10)
CHLORIDE SERPL-SCNC: 103 MMOL/L (ref 98–107)
CREAT SERPL-MCNC: 0.8 MG/DL (ref 0.51–0.95)
DEPRECATED HCO3 PLAS-SCNC: 25 MMOL/L (ref 22–29)
EGFRCR SERPLBLD CKD-EPI 2021: 87 ML/MIN/1.73M2
ERYTHROCYTE [DISTWIDTH] IN BLOOD BY AUTOMATED COUNT: 12.5 % (ref 10–15)
GLUCOSE SERPL-MCNC: 105 MG/DL (ref 70–99)
HCT VFR BLD AUTO: 41 % (ref 35–47)
HGB BLD-MCNC: 13.8 G/DL (ref 11.7–15.7)
MCH RBC QN AUTO: 30.7 PG (ref 26.5–33)
MCHC RBC AUTO-ENTMCNC: 33.7 G/DL (ref 31.5–36.5)
MCV RBC AUTO: 91 FL (ref 78–100)
PLATELET # BLD AUTO: 293 10E3/UL (ref 150–450)
POTASSIUM SERPL-SCNC: 3.9 MMOL/L (ref 3.4–5.3)
PROT SERPL-MCNC: 7.1 G/DL (ref 6.4–8.3)
RBC # BLD AUTO: 4.49 10E6/UL (ref 3.8–5.2)
SODIUM SERPL-SCNC: 140 MMOL/L (ref 135–145)
VIT B12 SERPL-MCNC: 713 PG/ML (ref 232–1245)
VIT D+METAB SERPL-MCNC: 24 NG/ML (ref 20–50)
WBC # BLD AUTO: 7.5 10E3/UL (ref 4–11)

## 2024-02-23 PROCEDURE — 82306 VITAMIN D 25 HYDROXY: CPT | Performed by: FAMILY MEDICINE

## 2024-02-23 PROCEDURE — 82607 VITAMIN B-12: CPT | Performed by: FAMILY MEDICINE

## 2024-02-23 PROCEDURE — 80053 COMPREHEN METABOLIC PANEL: CPT | Performed by: FAMILY MEDICINE

## 2024-02-23 PROCEDURE — 85027 COMPLETE CBC AUTOMATED: CPT | Performed by: FAMILY MEDICINE

## 2024-07-19 ENCOUNTER — LAB REQUISITION (OUTPATIENT)
Dept: LAB | Facility: CLINIC | Age: 54
End: 2024-07-19
Payer: COMMERCIAL

## 2024-07-19 DIAGNOSIS — J39.2 OTHER DISEASES OF PHARYNX: ICD-10-CM

## 2024-07-19 DIAGNOSIS — E07.9 DISORDER OF THYROID, UNSPECIFIED: ICD-10-CM

## 2024-07-19 LAB
BASOPHILS # BLD AUTO: 0 10E3/UL (ref 0–0.2)
BASOPHILS NFR BLD AUTO: 1 %
EOSINOPHIL # BLD AUTO: 0.2 10E3/UL (ref 0–0.7)
EOSINOPHIL NFR BLD AUTO: 3 %
ERYTHROCYTE [DISTWIDTH] IN BLOOD BY AUTOMATED COUNT: 11.8 % (ref 10–15)
HCT VFR BLD AUTO: 41.4 % (ref 35–47)
HGB BLD-MCNC: 14 G/DL (ref 11.7–15.7)
IMM GRANULOCYTES # BLD: 0 10E3/UL
IMM GRANULOCYTES NFR BLD: 0 %
LYMPHOCYTES # BLD AUTO: 2.1 10E3/UL (ref 0.8–5.3)
LYMPHOCYTES NFR BLD AUTO: 33 %
MCH RBC QN AUTO: 30.8 PG (ref 26.5–33)
MCHC RBC AUTO-ENTMCNC: 33.8 G/DL (ref 31.5–36.5)
MCV RBC AUTO: 91 FL (ref 78–100)
MONOCYTES # BLD AUTO: 0.5 10E3/UL (ref 0–1.3)
MONOCYTES NFR BLD AUTO: 7 %
NEUTROPHILS # BLD AUTO: 3.7 10E3/UL (ref 1.6–8.3)
NEUTROPHILS NFR BLD AUTO: 56 %
NRBC # BLD AUTO: 0 10E3/UL
NRBC BLD AUTO-RTO: 0 /100
PLATELET # BLD AUTO: 290 10E3/UL (ref 150–450)
RBC # BLD AUTO: 4.55 10E6/UL (ref 3.8–5.2)
WBC # BLD AUTO: 6.5 10E3/UL (ref 4–11)

## 2024-07-19 PROCEDURE — 87081 CULTURE SCREEN ONLY: CPT | Performed by: FAMILY MEDICINE

## 2024-07-19 PROCEDURE — 86376 MICROSOMAL ANTIBODY EACH: CPT | Performed by: FAMILY MEDICINE

## 2024-07-19 PROCEDURE — 80053 COMPREHEN METABOLIC PANEL: CPT | Performed by: FAMILY MEDICINE

## 2024-07-19 PROCEDURE — 84439 ASSAY OF FREE THYROXINE: CPT | Performed by: FAMILY MEDICINE

## 2024-07-19 PROCEDURE — 85025 COMPLETE CBC W/AUTO DIFF WBC: CPT | Performed by: FAMILY MEDICINE

## 2024-07-19 PROCEDURE — 87635 SARS-COV-2 COVID-19 AMP PRB: CPT | Performed by: FAMILY MEDICINE

## 2024-07-19 PROCEDURE — 84443 ASSAY THYROID STIM HORMONE: CPT | Performed by: FAMILY MEDICINE

## 2024-07-20 LAB
ALBUMIN SERPL BCG-MCNC: 4.7 G/DL (ref 3.5–5.2)
ALP SERPL-CCNC: 93 U/L (ref 40–150)
ALT SERPL W P-5'-P-CCNC: 25 U/L (ref 0–50)
ANION GAP SERPL CALCULATED.3IONS-SCNC: 13 MMOL/L (ref 7–15)
AST SERPL W P-5'-P-CCNC: 32 U/L (ref 0–45)
BILIRUB SERPL-MCNC: 0.5 MG/DL
BUN SERPL-MCNC: 16.1 MG/DL (ref 6–20)
CALCIUM SERPL-MCNC: 9.6 MG/DL (ref 8.8–10.4)
CHLORIDE SERPL-SCNC: 101 MMOL/L (ref 98–107)
CREAT SERPL-MCNC: 0.78 MG/DL (ref 0.51–0.95)
EGFRCR SERPLBLD CKD-EPI 2021: 89 ML/MIN/1.73M2
GLUCOSE SERPL-MCNC: 96 MG/DL (ref 70–99)
HCO3 SERPL-SCNC: 26 MMOL/L (ref 22–29)
POTASSIUM SERPL-SCNC: 4.5 MMOL/L (ref 3.4–5.3)
PROT SERPL-MCNC: 7.6 G/DL (ref 6.4–8.3)
SARS-COV-2 RNA RESP QL NAA+PROBE: NEGATIVE
SODIUM SERPL-SCNC: 140 MMOL/L (ref 135–145)
T4 FREE SERPL-MCNC: 1.26 NG/DL (ref 0.9–1.7)
TSH SERPL DL<=0.005 MIU/L-ACNC: 2.83 UIU/ML (ref 0.3–4.2)

## 2024-07-22 LAB
BACTERIA SPEC CULT: NORMAL
THYROPEROXIDASE AB SERPL-ACNC: 20 IU/ML

## 2024-07-24 ENCOUNTER — HOSPITAL ENCOUNTER (OUTPATIENT)
Dept: ULTRASOUND IMAGING | Facility: CLINIC | Age: 54
Discharge: HOME | End: 2024-07-24
Attending: FAMILY MEDICINE | Admitting: FAMILY MEDICINE
Payer: COMMERCIAL

## 2024-07-24 DIAGNOSIS — E07.9 THYROID MASS: ICD-10-CM

## 2024-07-24 PROCEDURE — 76536 US EXAM OF HEAD AND NECK: CPT

## 2024-11-25 ENCOUNTER — ANCILLARY PROCEDURE (OUTPATIENT)
Dept: MAMMOGRAPHY | Facility: CLINIC | Age: 54
End: 2024-11-25
Attending: FAMILY MEDICINE
Payer: COMMERCIAL

## 2024-11-25 DIAGNOSIS — Z12.31 VISIT FOR SCREENING MAMMOGRAM: ICD-10-CM

## 2024-11-25 PROCEDURE — 77063 BREAST TOMOSYNTHESIS BI: CPT

## 2025-01-13 ENCOUNTER — NURSE TRIAGE (OUTPATIENT)
Dept: FAMILY MEDICINE | Facility: CLINIC | Age: 55
End: 2025-01-13
Payer: COMMERCIAL

## 2025-01-13 ENCOUNTER — HOSPITAL ENCOUNTER (EMERGENCY)
Facility: CLINIC | Age: 55
Discharge: HOME | End: 2025-01-13
Attending: FAMILY MEDICINE | Admitting: FAMILY MEDICINE
Payer: COMMERCIAL

## 2025-01-13 ENCOUNTER — APPOINTMENT (OUTPATIENT)
Dept: CT IMAGING | Facility: CLINIC | Age: 55
End: 2025-01-13
Attending: FAMILY MEDICINE
Payer: COMMERCIAL

## 2025-01-13 ENCOUNTER — APPOINTMENT (OUTPATIENT)
Dept: GENERAL RADIOLOGY | Facility: CLINIC | Age: 55
End: 2025-01-13
Attending: FAMILY MEDICINE
Payer: COMMERCIAL

## 2025-01-13 VITALS
RESPIRATION RATE: 20 BRPM | HEART RATE: 82 BPM | DIASTOLIC BLOOD PRESSURE: 81 MMHG | WEIGHT: 180 LBS | OXYGEN SATURATION: 98 % | TEMPERATURE: 97.6 F | BODY MASS INDEX: 27.28 KG/M2 | HEIGHT: 68 IN | SYSTOLIC BLOOD PRESSURE: 123 MMHG

## 2025-01-13 DIAGNOSIS — R07.9 CHEST PAIN, UNSPECIFIED TYPE: ICD-10-CM

## 2025-01-13 LAB
ALBUMIN SERPL BCG-MCNC: 4.4 G/DL (ref 3.5–5.2)
ALP SERPL-CCNC: 122 U/L (ref 40–150)
ALT SERPL W P-5'-P-CCNC: 21 U/L (ref 0–50)
ANION GAP SERPL CALCULATED.3IONS-SCNC: 13 MMOL/L (ref 7–15)
AST SERPL W P-5'-P-CCNC: 26 U/L (ref 0–45)
ATRIAL RATE - MUSE: 88 BPM
BASOPHILS # BLD AUTO: 0 10E3/UL (ref 0–0.2)
BASOPHILS NFR BLD AUTO: 1 %
BILIRUB SERPL-MCNC: 0.7 MG/DL
BUN SERPL-MCNC: 22.2 MG/DL (ref 6–20)
CALCIUM SERPL-MCNC: 9.3 MG/DL (ref 8.8–10.4)
CHLORIDE SERPL-SCNC: 101 MMOL/L (ref 98–107)
CREAT SERPL-MCNC: 0.86 MG/DL (ref 0.51–0.95)
D DIMER PPP FEU-MCNC: 0.89 UG/ML FEU (ref 0–0.5)
DIASTOLIC BLOOD PRESSURE - MUSE: NORMAL MMHG
EGFRCR SERPLBLD CKD-EPI 2021: 79 ML/MIN/1.73M2
EOSINOPHIL # BLD AUTO: 0.1 10E3/UL (ref 0–0.7)
EOSINOPHIL NFR BLD AUTO: 2 %
ERYTHROCYTE [DISTWIDTH] IN BLOOD BY AUTOMATED COUNT: 11.9 % (ref 10–15)
GLUCOSE SERPL-MCNC: 95 MG/DL (ref 70–99)
HCO3 SERPL-SCNC: 23 MMOL/L (ref 22–29)
HCT VFR BLD AUTO: 43.8 % (ref 35–47)
HGB BLD-MCNC: 14.8 G/DL (ref 11.7–15.7)
IMM GRANULOCYTES # BLD: 0 10E3/UL
IMM GRANULOCYTES NFR BLD: 0 %
INTERPRETATION ECG - MUSE: NORMAL
LIPASE SERPL-CCNC: 29 U/L (ref 13–60)
LYMPHOCYTES # BLD AUTO: 1.6 10E3/UL (ref 0.8–5.3)
LYMPHOCYTES NFR BLD AUTO: 21 %
MCH RBC QN AUTO: 31.3 PG (ref 26.5–33)
MCHC RBC AUTO-ENTMCNC: 33.8 G/DL (ref 31.5–36.5)
MCV RBC AUTO: 93 FL (ref 78–100)
MONOCYTES # BLD AUTO: 0.5 10E3/UL (ref 0–1.3)
MONOCYTES NFR BLD AUTO: 7 %
NEUTROPHILS # BLD AUTO: 5.4 10E3/UL (ref 1.6–8.3)
NEUTROPHILS NFR BLD AUTO: 70 %
NRBC # BLD AUTO: 0 10E3/UL
NRBC BLD AUTO-RTO: 0 /100
P AXIS - MUSE: 79 DEGREES
PLATELET # BLD AUTO: 333 10E3/UL (ref 150–450)
POTASSIUM SERPL-SCNC: 4 MMOL/L (ref 3.4–5.3)
PR INTERVAL - MUSE: 164 MS
PROT SERPL-MCNC: 7.4 G/DL (ref 6.4–8.3)
QRS DURATION - MUSE: 74 MS
QT - MUSE: 370 MS
QTC - MUSE: 447 MS
R AXIS - MUSE: 55 DEGREES
RBC # BLD AUTO: 4.73 10E6/UL (ref 3.8–5.2)
SODIUM SERPL-SCNC: 137 MMOL/L (ref 135–145)
SYSTOLIC BLOOD PRESSURE - MUSE: NORMAL MMHG
T AXIS - MUSE: 76 DEGREES
TROPONIN T SERPL HS-MCNC: 7 NG/L
VENTRICULAR RATE- MUSE: 88 BPM
WBC # BLD AUTO: 7.7 10E3/UL (ref 4–11)

## 2025-01-13 PROCEDURE — 93010 ELECTROCARDIOGRAM REPORT: CPT | Performed by: FAMILY MEDICINE

## 2025-01-13 PROCEDURE — 84484 ASSAY OF TROPONIN QUANT: CPT | Performed by: FAMILY MEDICINE

## 2025-01-13 PROCEDURE — 83690 ASSAY OF LIPASE: CPT | Performed by: FAMILY MEDICINE

## 2025-01-13 PROCEDURE — 85018 HEMOGLOBIN: CPT | Performed by: FAMILY MEDICINE

## 2025-01-13 PROCEDURE — 85379 FIBRIN DEGRADATION QUANT: CPT | Performed by: FAMILY MEDICINE

## 2025-01-13 PROCEDURE — 99284 EMERGENCY DEPT VISIT MOD MDM: CPT | Performed by: FAMILY MEDICINE

## 2025-01-13 PROCEDURE — 250N000011 HC RX IP 250 OP 636: Performed by: FAMILY MEDICINE

## 2025-01-13 PROCEDURE — 250N000009 HC RX 250: Performed by: FAMILY MEDICINE

## 2025-01-13 PROCEDURE — 36415 COLL VENOUS BLD VENIPUNCTURE: CPT | Performed by: FAMILY MEDICINE

## 2025-01-13 PROCEDURE — 71046 X-RAY EXAM CHEST 2 VIEWS: CPT

## 2025-01-13 PROCEDURE — 85004 AUTOMATED DIFF WBC COUNT: CPT | Performed by: FAMILY MEDICINE

## 2025-01-13 PROCEDURE — 71275 CT ANGIOGRAPHY CHEST: CPT

## 2025-01-13 PROCEDURE — 99285 EMERGENCY DEPT VISIT HI MDM: CPT | Mod: 25 | Performed by: FAMILY MEDICINE

## 2025-01-13 PROCEDURE — 80053 COMPREHEN METABOLIC PANEL: CPT | Performed by: FAMILY MEDICINE

## 2025-01-13 PROCEDURE — 93005 ELECTROCARDIOGRAM TRACING: CPT | Performed by: FAMILY MEDICINE

## 2025-01-13 RX ORDER — IOPAMIDOL 755 MG/ML
78 INJECTION, SOLUTION INTRAVASCULAR ONCE
Status: COMPLETED | OUTPATIENT
Start: 2025-01-13 | End: 2025-01-13

## 2025-01-13 RX ADMIN — IOPAMIDOL 78 ML: 755 INJECTION, SOLUTION INTRAVENOUS at 11:07

## 2025-01-13 RX ADMIN — SODIUM CHLORIDE 100 ML: 9 INJECTION, SOLUTION INTRAVENOUS at 11:08

## 2025-01-13 ASSESSMENT — ENCOUNTER SYMPTOMS
BLOOD IN STOOL: 0
HEADACHES: 0
FEVER: 0
CONSTIPATION: 0
COUGH: 0
SHORTNESS OF BREATH: 0
CHILLS: 0
FREQUENCY: 0
PALPITATIONS: 0
VOMITING: 0
WHEEZING: 0
SINUS PRESSURE: 0
DIAPHORESIS: 0
DYSURIA: 0
NAUSEA: 0
SORE THROAT: 0
ABDOMINAL PAIN: 0
DIARRHEA: 0

## 2025-01-13 ASSESSMENT — COLUMBIA-SUICIDE SEVERITY RATING SCALE - C-SSRS
2. HAVE YOU ACTUALLY HAD ANY THOUGHTS OF KILLING YOURSELF IN THE PAST MONTH?: NO
6. HAVE YOU EVER DONE ANYTHING, STARTED TO DO ANYTHING, OR PREPARED TO DO ANYTHING TO END YOUR LIFE?: NO
1. IN THE PAST MONTH, HAVE YOU WISHED YOU WERE DEAD OR WISHED YOU COULD GO TO SLEEP AND NOT WAKE UP?: NO

## 2025-01-13 ASSESSMENT — ACTIVITIES OF DAILY LIVING (ADL)
ADLS_ACUITY_SCORE: 41

## 2025-01-13 NOTE — DISCHARGE INSTRUCTIONS
ICD-10-CM    1. Chest pain, unspecified type  R07.9     no seripus findings.  m,ay be chest wall or GERD - avoid food befoire bed. avoid heavy lifting.  stay hyudrated 64 oz fluid per day./ consider antacid meds such as Prilosec 20 mg orally daily for 2 weeks.  follow-up clinic

## 2025-01-13 NOTE — ED PROVIDER NOTES
History     Chief Complaint   Patient presents with    Chest Pain     Left anterior with tightness started saturday     HPI  Nini Burden is a 54 year old female who presents with anterior chest pain left side pleuritic and left upper quadrant.  Onset over Saturday.  Did shovel snow on Friday and was not experiencing pain at that time.  No significant associated dyspnea although avoids taking a deep breath due to the pain.  Mild pain overall.  Can occur without any triggers.  Occurs while seated.  Nonexertional.  No associated nausea vomiting sweats.  May be some altered bowel habits with softer stool today but nothing significant.  No blood in the stool or black tarry stools.  No dysuria urgency frequency hematuria.  No known heart disease.  No significant underlying VTE risk.  History of Crohn's disease and obstructive sleep apnea.  No tobacco use now quit about 2006.  Has only occasional alcohol use.  Respiratory symptoms cough congestion.    Allergies:  Allergies   Allergen Reactions    Amoxicillin [Penicillins] Rash     Full body rash    Citalopram      Rapid heart beat, sensation of burning up legs    Diphenhydramine-Pe-Apap      Insomnia     Erythromycin Nausea and Vomiting     Vomiting    Sulfa Antibiotics Difficulty breathing     Lung spasms       Problem List:    Patient Active Problem List    Diagnosis Date Noted    ALMA (Obstructive Sleep Apnea)-Mild (AHI 12); REM RDI 68 12/15/2009     Priority: Medium     Sleep latency 13 minutes without Ambien.  REM achieved.   REM latency 90 minutes.  Sleep efficiency 86%. Total sleep time 427 minutes.    Sleep architecture:  Stage 1, 11% (5%), stage 2, 51% (45-55%), stage 3, 20% (15-20%), stage REM, 18% (20-25%).  AHI was 12, without desaturations. RDI 26.  REM RDI 68, consistent with mild  ALMA, severe during REM sleep.  Periodic Limb Movement Index 0/hour.  These findings were reviewed with patient.       Crohns disease 10/27/2009     Priority: Medium     "Fibromyalgia 10/27/2009     Priority: Medium        Past Medical History:    Past Medical History:   Diagnosis Date    Excessive or frequent menstruation     Mild intermittent asthma     Regional enteritis of small intestine with large intestine (H)        Past Surgical History:    Past Surgical History:   Procedure Laterality Date    OOPHORECTOMY Left 1984    SALPINGO OOPHORECTOMY,R/L/ARY  1984     Oophorectomy, LT    SURGICAL HISTORY OF -   2001    excized ganglion cyst       Family History:    Family History   Problem Relation Age of Onset    Diabetes Mother     Other - See Comments Mother         Triple bypass surgery     Respiratory Father         ALMA    Respiratory Brother         ALMA       Social History:  Marital Status:   [2]  Social History     Substance Use Topics    Alcohol use: Yes     Comment: 6 beers a month    Drug use: No        Medications:    balsalazide (COLAZAL) 750 MG capsule  Cholecalciferol (VITAMIN D PO)  multivitamin w/minerals (MULTI-VITAMIN) tablet  ORTHO-NOVUM 7/7/7 (28) OR  oxyCODONE-acetaminophen (PERCOCET) 5-325 MG per tablet  Taurine 1000 MG CAPS  vitamin B complex with vitamin C (VITAMIN  B COMPLEX) tablet          Review of Systems   Constitutional:  Negative for chills, diaphoresis and fever.   HENT:  Negative for ear pain, sinus pressure and sore throat.    Eyes:  Negative for visual disturbance.   Respiratory:  Negative for cough, shortness of breath and wheezing.    Cardiovascular:  Positive for chest pain. Negative for palpitations.   Gastrointestinal:  Negative for abdominal pain, blood in stool, constipation, diarrhea, nausea and vomiting.   Genitourinary:  Negative for dysuria, frequency and urgency.   Skin:  Negative for rash.   Neurological:  Negative for headaches.   All other systems reviewed and are negative.      Physical Exam   BP: (!) 147/86  Pulse: 100  Temp: 97.6  F (36.4  C)  Resp: 20  Height: 172.7 cm (5' 8\")  Weight: 81.6 kg (180 lb)  SpO2: 94 " %      Physical Exam  Constitutional:       General: She is in acute distress.      Appearance: She is not diaphoretic.   Eyes:      Conjunctiva/sclera: Conjunctivae normal.   Cardiovascular:      Rate and Rhythm: Normal rate and regular rhythm.      Heart sounds: No murmur heard.  Pulmonary:      Effort: Pulmonary effort is normal. No respiratory distress.      Breath sounds: Normal breath sounds. No stridor. No wheezing or rhonchi.   Abdominal:      General: Abdomen is flat. There is no distension.      Palpations: Abdomen is soft. There is no mass.      Tenderness: There is no abdominal tenderness. There is no guarding.   Musculoskeletal:      Cervical back: Neck supple.      Right lower leg: No edema.      Left lower leg: No edema.   Skin:     Coloration: Skin is not pale.      Findings: No rash.   Neurological:      Mental Status: She is alert.      Motor: No weakness.         ED Course        Procedures                EKG Interpretation:      Interpreted by August Coleman MD  EKG done at 0835 hrs. demonstrates a sinus rhythm at 80 bpm normal axis.  There is no ST change.  No T wave changes.  Normal R progression and no Q waves.  Normal intervals.  Normal conduction.  No ectopy.  Impression sinus rhythm at 88 bpm no significant acute changes    Critical Care time:  none             Results for orders placed or performed during the hospital encounter of 01/13/25 (from the past 24 hours)   EKG 12-lead, tracing only   Result Value Ref Range    Systolic Blood Pressure  mmHg    Diastolic Blood Pressure  mmHg    Ventricular Rate 88 BPM    Atrial Rate 88 BPM    NV Interval 164 ms    QRS Duration 74 ms     ms    QTc 447 ms    P Axis 79 degrees    R AXIS 55 degrees    T Axis 76 degrees    Interpretation ECG       Sinus rhythm  Possible Left atrial enlargement  Borderline ECG  No previous ECGs available  Confirmed by SEE ED PROVIDER NOTE FOR, ECG INTERPRETATION ([id number]),  Teddy Taylor ([id number]) on 1/13/2025  2:30:16 PM     CBC with platelets differential    Narrative    The following orders were created for panel order CBC with platelets differential.  Procedure                               Abnormality         Status                     ---------                               -----------         ------                     CBC with platelets and d...[[id number]]                      Final result                 Please view results for these tests on the individual orders.   Troponin T, High Sensitivity   Result Value Ref Range    Troponin T, High Sensitivity 7 <=14 ng/L   CBC with platelets and differential   Result Value Ref Range    WBC Count 7.7 4.0 - 11.0 10e3/uL    RBC Count 4.73 3.80 - 5.20 10e6/uL    Hemoglobin 14.8 11.7 - 15.7 g/dL    Hematocrit 43.8 35.0 - 47.0 %    MCV 93 78 - 100 fL    MCH 31.3 26.5 - 33.0 pg    MCHC 33.8 31.5 - 36.5 g/dL    RDW 11.9 10.0 - 15.0 %    Platelet Count 333 150 - 450 10e3/uL    % Neutrophils 70 %    % Lymphocytes 21 %    % Monocytes 7 %    % Eosinophils 2 %    % Basophils 1 %    % Immature Granulocytes 0 %    NRBCs per 100 WBC 0 <1 /100    Absolute Neutrophils 5.4 1.6 - 8.3 10e3/uL    Absolute Lymphocytes 1.6 0.8 - 5.3 10e3/uL    Absolute Monocytes 0.5 0.0 - 1.3 10e3/uL    Absolute Eosinophils 0.1 0.0 - 0.7 10e3/uL    Absolute Basophils 0.0 0.0 - 0.2 10e3/uL    Absolute Immature Granulocytes 0.0 <=0.4 10e3/uL    Absolute NRBCs 0.0 10e3/uL   Comprehensive metabolic panel   Result Value Ref Range    Sodium 137 135 - 145 mmol/L    Potassium 4.0 3.4 - 5.3 mmol/L    Carbon Dioxide (CO2) 23 22 - 29 mmol/L    Anion Gap 13 7 - 15 mmol/L    Urea Nitrogen 22.2 (H) 6.0 - 20.0 mg/dL    Creatinine 0.86 0.51 - 0.95 mg/dL    GFR Estimate 79 >60 mL/min/1.73m2    Calcium 9.3 8.8 - 10.4 mg/dL    Chloride 101 98 - 107 mmol/L    Glucose 95 70 - 99 mg/dL    Alkaline Phosphatase 122 40 - 150 U/L    AST 26 0 - 45 U/L    ALT 21 0 - 50 U/L    Protein Total 7.4 6.4 - 8.3 g/dL    Albumin 4.4 3.5 - 5.2 g/dL     Bilirubin Total 0.7 <=1.2 mg/dL   Lipase   Result Value Ref Range    Lipase 29 13 - 60 U/L   XR Chest 2 Views    Narrative    EXAM: XR CHEST 2 VIEWS  LOCATION: [facility]  DATE: 1/13/2025    INDICATION: chest pain  COMPARISON: 1/26/2009      Impression    IMPRESSION: Negative chest.   D dimer quantitative   Result Value Ref Range    D-Dimer Quantitative 0.89 (H) 0.00 - 0.50 ug/mL FEU    Narrative    This D-dimer assay is intended for use in conjunction with a clinical pretest probability assessment model to exclude pulmonary embolism (PE) and deep venous thrombosis (DVT) in outpatients suspected of PE or DVT. The cut-off value is 0.50 ug/mL FEU.    For patients 50 years of age or older, the application of age-adjusted cut-off values for D-Dimer may increase the specificity without significant effect on sensitivity. The literature suggested calculation age adjusted cut-off in ug/L = age in years x 10 ug/L. The results in this laboratory are reported as ug/mL rather than ug/L. The calculation for age adjusted cut off in ug/mL= age in years x 0.01 ug/mL. For example, the cut off for a 76 year old male is 76 x 0.01 ug/mL = 0.76 ug/mL (760 ug/L).    M Neela et al. Age adjusted D-dimer cut-off levels to rule out pulmonary embolism: The ADJUST-PE Study. PHONG 2014;311:5415-6388.; HJ Miguelina et al. Diagnostic accuracy of conventional or age adjusted D-dimer cutoff values in older patients with suspected venous thromboembolism. Systemic review and meta-analysis. BMJ 2013:346:f2492.   CT Chest Pulmonary Embolism w Contrast    Narrative    EXAM: CT CHEST PULMONARY EMBOLISM W CONTRAST  LOCATION: [facility]  DATE: 1/13/2025    INDICATION: Chest pain pleuritic  COMPARISON: Chest x-ray dated 01/26/2009.  TECHNIQUE: CT chest pulmonary angiogram during arterial phase injection of IV contrast. Multiplanar reformats and MIP reconstructions were performed. Dose reduction  techniques were used.   CONTRAST: 78mL isovue 370    FINDINGS:  ANGIOGRAM CHEST: Pulmonary arteries are normal caliber and negative for pulmonary emboli. Thoracic aorta is negative for dissection. No CT evidence of right heart strain.    LUNGS AND PLEURA: A few small nodular densities are seen adjacent to the fissures, favoring benign fissural lymph nodes. One example includes an oval-shaped pulmonary nodule along the major fissure in the right perihilar region measuring 7 x 3 mm, mean   diameter 5 mm (6-178). No follow-up is necessary. No suspicious-appearing pulmonary nodules or masses. Mild biapical pleural-parenchymal scarring. No airspace consolidation or pleural fluid. Large airways are patent. Small Bochdalek hernia on the right.    MEDIASTINUM/AXILLAE: Heart size is normal. No pericardial fluid. Thoracic aorta is normal in course and caliber. No enlarged thoracic lymph nodes.    CORONARY ARTERY CALCIFICATION: None.    UPPER ABDOMEN: Gallbladder is absent. A couple small low-density hepatic observations are noted, which are too small to characterize but most likely represent cysts or hemangiomas and do not require follow-up.    MUSCULOSKELETAL: Normal.      Impression    IMPRESSION:  1.  No pulmonary artery embolism.  2.  No acute cardiopulmonary process.    REFERENCE:  Guidelines for Management of Incidental Pulmonary Nodules Detected on CT Images: From the Fleischner Society 2017.   Guidelines apply to incidental nodules in patients who are 35 years or older.  Guidelines do not apply to lung cancer screening, patients with immunosuppression, or patients with known primary cancer.    MULTIPLE NODULES  Nodule size 6 mm or larger  Low-risk patients: Follow-up CT at 3-6 months, then consider CT at 18-24 months.  High-risk patients: Follow-up CT at 3-6 months, then at 18-24 months if no change.  -Use most suspicious nodule as guide to management.             Medications   iopamidol (ISOVUE-370) solution 78 mL  (78 mLs Intravenous $Given 1/13/25 1107)   sodium chloride 0.9 % bag 500mL for CT scan flush use (100 mLs As instructed $Given 1/13/25 110)       Assessments & Plan (with Medical Decision Making)     MDM; Nini Burden is a 54 year old female presents with anterior chest pain left-sided no radiation.  Left upper quadrant region involved as well.  No obvious significant dyspnea.  Pleuritic nature and mild tachycardia on presentation although no VTE risk known.  Plan for D-dimer troponin EKG chest x-ray chemistry panel lipase CBC.    Her findings are reassuring including EKG D-dimer CBC comprehensive panel.  The D-dimer was mildly elevated and CT performed demonstrates no PE.  There were incidental nodule seen.  Additional recommendations as below including following up in clinic for chest pain considering stress testing although my suspicion for this is lower precautions given for return.    I have reviewed the nursing notes.    I have reviewed the findings, diagnosis, plan and need for follow up with the patient.           Medical Decision Making  The patient's presentation was of moderate complexity (an undiagnosed new problem with uncertain prognosis).    The patient's evaluation involved:  ordering and/or review of 3+ test(s) in this encounter (see separate area of note for details)    The patient's management necessitated moderate risk (IV contrast administration).        New Prescriptions    No medications on file       Final diagnoses:   Chest pain, unspecified type - no seripus findings.  m,ay be chest wall or GERD - avoid food befoire bed. avoid heavy lifting.  stay hyudrated 64 oz fluid per day./ consider antacid meds such as Prilosec 20 mg orally daily for 2 weeks.  follow-up clinic       1/13/2025   [facility] EMERGENCY DEPT       August Coleman MD  01/13/25 [id number]

## 2025-01-13 NOTE — TELEPHONE ENCOUNTER
S-(situation): Pt calls with c/o intermittent chest pain.     B-(background): Pt started having pain on Saturday 1/11/25 afternoon. Says that she thought it could've been r/t shoveling snow the day before, that she may have pulled a muscle. Says that she didn't feel any pain while shoveling, says that it wasn't very difficult and was a small amount of snow.     A-(assessment): Says that the pain is under her left breast, and also started feeling tight in her left shoulder today. Has been intermittent, but is getting worse every day. Says that she has pain anytime she takes a deep breath, and is very uncomfortable when laying on her right side. Denies s/s of respiratory distress, currently rates pain at 3/10. See triage assessment below for more information.     R-(recommendations): Per triage protocol recommendation, pt is advised to go to ED now for evaluation. Pt is agreeable to this plan, says that she'll come to [place] ED.    Reason for Disposition   Pain also in shoulder(s) or arm(s) or jaw    Additional Information   Negative: SEVERE difficulty breathing (e.g., struggling for each breath, speaks in single words)   Negative: Difficult to awaken or acting confused (e.g., disoriented, slurred speech)   Negative: Shock suspected (e.g., cold/pale/clammy skin, too weak to stand, low BP, rapid pulse)   Negative: Passed out (i.e., lost consciousness, collapsed and was not responding)   Negative: Chest pain lasting longer than 5 minutes and ANY of the following:         Pain is crushing, pressure-like, or heavy         Over 44 years old          Over 30 years old and one cardiac risk factor (e.g diabetes, high blood pressure, high cholesterol, smoker, or family history of heart disease)         History of heart disease (e.g. angina, heart attack, heart failure, bypass surgery, takes nitroglycerin)     Pain has been intermittent, only lasts a few seconds at a time.   Negative: Heart beating < 50 beats per minute OR >  "140 beats per minute   Negative: Visible sweat on face or sweat dripping down face   Negative: Sounds like a life-threatening emergency to the triager   Negative: Followed an injury to chest   Negative: SEVERE chest pain    Answer Assessment - Initial Assessment Questions  1. LOCATION: \"Where does it hurt?\"        Under left breast  2. RADIATION: \"Does the pain go anywhere else?\" (e.g., into neck, jaw, arms, back)      Tightness into left shoulder that started today.   3. ONSET: \"When did the chest pain begin?\" (Minutes, hours or days)       Saturday 1/11/25  4. PATTERN: \"Does the pain come and go, or has it been constant since it started?\"  \"Does it get worse with exertion?\"       Has been intermittent, but getting worse. Reports increased pain when she takes a deep breath or lays on her right side.   5. DURATION: \"How long does it last\" (e.g., seconds, minutes, hours)      A few seconds, maybe 5 seconds if taking a deep breath  6. SEVERITY: \"How bad is the pain?\"  (e.g., Scale 1-10; mild, moderate, or severe)     - MILD (1-3): doesn't interfere with normal activities      - MODERATE (4-7): interferes with normal activities or awakens from sleep     - SEVERE (8-10): excruciating pain, unable to do any normal activities        Currently 3.  7. CARDIAC RISK FACTORS: \"Do you have any history of heart problems or risk factors for heart disease?\" (e.g., angina, prior heart attack; diabetes, high blood pressure, high cholesterol, smoker, or strong family history of heart disease)      No, mom had history of heart surgery  8. PULMONARY RISK FACTORS: \"Do you have any history of lung disease?\"  (e.g., blood clots in lung, asthma, emphysema, birth control pills)      Mild asthma  9. CAUSE: \"What do you think is causing the chest pain?\"      Possibly r/t shoveling on Friday, says that she didn't feel any pain during this activity and that it wasn't overly strenuous.   10. OTHER SYMPTOMS: \"Do you have any other symptoms?\" " "(e.g., dizziness, nausea, vomiting, sweating, fever, difficulty breathing, cough)        Pain with deep breath, was nauseous Monday and Tuesday last week.   11. PREGNANCY: \"Is there any chance you are pregnant?\" \"When was your last menstrual period?\"        N/A    Protocols used: Chest Pain-A-[place]      Yolis Richmond RN  [facility]  "

## 2025-01-13 NOTE — ED TRIAGE NOTES
Pt reports left anterior chest pain that started Saturday. The pain continues to get worse. Pain can be reproduced. Pt did shovel on Friday. Reports some diaphoresis.      Triage Assessment (Adult)       Row Name 01/13/25 0826          Triage Assessment    Airway WDL WDL        Respiratory WDL    Respiratory WDL WDL        Cardiac WDL    Cardiac WDL X;chest pain        Cognitive/Neuro/Behavioral WDL    Cognitive/Neuro/Behavioral WDL WDL

## 2025-07-14 ENCOUNTER — LAB REQUISITION (OUTPATIENT)
Dept: LAB | Facility: CLINIC | Age: 55
End: 2025-07-14

## 2025-07-14 DIAGNOSIS — M25.50 PAIN IN UNSPECIFIED JOINT: ICD-10-CM

## 2025-07-14 LAB
ALBUMIN SERPL BCG-MCNC: 4.3 G/DL (ref 3.5–5.2)
ALP SERPL-CCNC: 93 U/L (ref 40–150)
ALT SERPL W P-5'-P-CCNC: 20 U/L (ref 0–50)
ANION GAP SERPL CALCULATED.3IONS-SCNC: 11 MMOL/L (ref 7–15)
AST SERPL W P-5'-P-CCNC: 22 U/L (ref 0–45)
BILIRUB SERPL-MCNC: 0.5 MG/DL
BUN SERPL-MCNC: 17.1 MG/DL (ref 6–20)
CALCIUM SERPL-MCNC: 9.2 MG/DL (ref 8.8–10.4)
CHLORIDE SERPL-SCNC: 103 MMOL/L (ref 98–107)
CREAT SERPL-MCNC: 0.89 MG/DL (ref 0.51–0.95)
EGFRCR SERPLBLD CKD-EPI 2021: 76 ML/MIN/1.73M2
GLUCOSE SERPL-MCNC: 77 MG/DL (ref 70–99)
HCO3 SERPL-SCNC: 25 MMOL/L (ref 22–29)
POTASSIUM SERPL-SCNC: 4 MMOL/L (ref 3.4–5.3)
PROT SERPL-MCNC: 6.6 G/DL (ref 6.4–8.3)
SODIUM SERPL-SCNC: 139 MMOL/L (ref 135–145)

## 2025-07-14 PROCEDURE — 82310 ASSAY OF CALCIUM: CPT | Performed by: FAMILY MEDICINE
